# Patient Record
Sex: FEMALE | Race: WHITE | NOT HISPANIC OR LATINO | Employment: STUDENT | URBAN - METROPOLITAN AREA
[De-identification: names, ages, dates, MRNs, and addresses within clinical notes are randomized per-mention and may not be internally consistent; named-entity substitution may affect disease eponyms.]

---

## 2023-10-03 ENCOUNTER — OFFICE VISIT (OUTPATIENT)
Dept: FAMILY MEDICINE CLINIC | Facility: CLINIC | Age: 23
End: 2023-10-03
Payer: COMMERCIAL

## 2023-10-03 VITALS
HEIGHT: 64 IN | OXYGEN SATURATION: 99 % | DIASTOLIC BLOOD PRESSURE: 70 MMHG | TEMPERATURE: 98.3 F | BODY MASS INDEX: 24.17 KG/M2 | WEIGHT: 141.6 LBS | HEART RATE: 88 BPM | SYSTOLIC BLOOD PRESSURE: 104 MMHG

## 2023-10-03 DIAGNOSIS — Z02.5 ROUTINE SPORTS PHYSICAL EXAM: Primary | ICD-10-CM

## 2023-10-03 PROBLEM — D35.2 PROLACTINOMA: Status: ACTIVE | Noted: 2023-10-03

## 2023-10-03 NOTE — PROGRESS NOTES
Chief Complaint   Patient presents with    New Patient Preventive Medicine Services     ED Visit at [facility] 8/28/23 due to Elevated prolactin level & Pituitary microadenoma.      Annual Exam     Pt. States she would like a Spots Physical.       BRANDON Wade is a pleasant 23 y.o. female with a PMH of pituitary adenoma who presents for an initial visit.     Presents today for sports physical. She is a senior at [employer] but originally from [place]. She plans to joint the diving team.   She denies a family history of cardiovascular disease, arrhythmia, sudden cardiac death.   She used to compete in Triathlon and was involved in diving in high school as well.   Wears contact lens in her right eye. She has an eye exam this summer.     She was evaluated by endocrinology at [facility] over the summer for her history of prolactinoma. No treatment was recommended aside from 1-year follow-up due to the small size.     History reviewed. No pertinent past medical history.    History reviewed. No pertinent surgical history.    History reviewed. No pertinent family history.    Social History     Socioeconomic History    Marital status: Single   Tobacco Use    Smoking status: Never     Passive exposure: Never    Smokeless tobacco: Never   Vaping Use    Vaping Use: Never used   Substance and Sexual Activity    Alcohol use: Yes     Comment: occasion    Drug use: Defer       Allergies   Allergen Reactions    Red Dye Nausea And Vomiting     Red dye 40       ROS    Review of Systems   Constitutional:  Negative for chills, diaphoresis and fever.   HENT:  Negative for congestion, hearing loss, sore throat and swollen glands.    Eyes:  Negative for blurred vision and visual disturbance.   Respiratory:  Negative for cough and shortness of breath.    Cardiovascular:  Negative for chest pain.   Gastrointestinal:  Negative for abdominal pain, blood in stool, constipation, diarrhea and GERD.    Endocrine: Negative for polyuria.   Genitourinary:  Negative for breast lump, breast pain, dysuria, hematuria, pelvic pain and pelvic pressure.   Musculoskeletal:  Negative for arthralgias, back pain and joint swelling.   Skin:  Negative for rash.   Neurological:  Negative for dizziness, weakness, numbness and headache.   Hematological:  Negative for adenopathy.   Psychiatric/Behavioral:  Negative for sleep disturbance and depressed mood. The patient is not nervous/anxious.      Vitals:    10/03/23 1516   BP: 104/70   Pulse: 88   Temp: 98.3 °F (36.8 °C)   SpO2: 99%     Body mass index is 24.29 kg/m².      Current Outpatient Medications:     Etonogestrel (Nexplanon) 68 MG implant subdermal implant, Inject 1 each into the appropriate area of the skin as directed by provider 1 (One) Time., Disp: , Rfl:     PE    Physical Exam  Vitals reviewed.   Constitutional:       General: She is not in acute distress.     Appearance: Normal appearance. She is well-developed.   HENT:      Head: Normocephalic and atraumatic.      Right Ear: Hearing, tympanic membrane and ear canal normal.      Left Ear: Hearing, tympanic membrane and ear canal normal.      Mouth/Throat:      Mouth: Mucous membranes are moist.      Dentition: Normal dentition.      Pharynx: Oropharynx is clear.   Eyes:      Conjunctiva/sclera: Conjunctivae normal.      Pupils: Pupils are equal, round, and reactive to light.   Neck:      Thyroid: No thyroid mass or thyromegaly.      Vascular: No carotid bruit.   Cardiovascular:      Rate and Rhythm: Normal rate and regular rhythm.      Heart sounds: Normal heart sounds, S1 normal and S2 normal. No murmur heard.  Pulmonary:      Effort: Pulmonary effort is normal.      Breath sounds: Normal breath sounds.   Abdominal:      General: Bowel sounds are normal. There is no abdominal bruit.      Palpations: Abdomen is soft. There is no mass.      Tenderness: There is no abdominal tenderness.   Musculoskeletal:          General: Normal range of motion.      Right shoulder: Normal range of motion. Normal strength.      Left shoulder: Normal range of motion. Normal strength.      Right elbow: Normal range of motion.      Left elbow: Normal range of motion.      Right wrist: No tenderness. Normal range of motion.      Left wrist: No tenderness. Normal range of motion.      Right hand: Normal. Normal range of motion.      Left hand: Normal. Normal range of motion.      Cervical back: Normal, normal range of motion and neck supple.      Thoracic back: Normal.      Lumbar back: Normal.      Right hip: Normal. Normal range of motion.      Left hip: Normal. Normal range of motion.      Right knee: Normal. Normal range of motion.      Left knee: Normal. Normal range of motion.      Right ankle: Normal range of motion.      Left ankle: Normal range of motion.   Lymphadenopathy:      Cervical: No cervical adenopathy.      Upper Body:      Right upper body: No supraclavicular adenopathy.      Left upper body: No supraclavicular adenopathy.   Skin:     General: Skin is warm and dry.      Findings: No rash.      Nails: There is no clubbing.      Comments: No suspicious nevi on clothed exam.    Neurological:      Mental Status: She is alert.      Gait: Gait normal.      Deep Tendon Reflexes: Reflexes normal.   Psychiatric:         Speech: Speech normal.         Behavior: Behavior normal.        A/P    Problem List Items Addressed This Visit    None  Visit Diagnoses       Routine sports physical exam    -  Primary    Visual acuity performed and patient has had recent eye evaluation.   Cleared to participate in sports related activities.   Completed forms today.  RTC prn.            Plan of care was reviewed with patient at the conclusion of today's visit. Education was provided regarding diagnoses, management, prescribed or recommended OTC products, and the importance of compliance with follow-up appointments. The patient was counseled regarding  the risks, benefits, and possible side-effects of treatment. I advised the patient to keep me informed of any acute changes in their status including new, worsening, or persistent symptoms. Patient expresses understanding and agreement with the management plan.        REYMUNDO Rodriguez

## 2024-01-07 PROCEDURE — 87205 SMEAR GRAM STAIN: CPT | Performed by: FAMILY MEDICINE

## 2024-01-07 PROCEDURE — 87070 CULTURE OTHR SPECIMN AEROBIC: CPT | Performed by: FAMILY MEDICINE
